# Patient Record
Sex: FEMALE
[De-identification: names, ages, dates, MRNs, and addresses within clinical notes are randomized per-mention and may not be internally consistent; named-entity substitution may affect disease eponyms.]

---

## 2021-02-03 PROBLEM — Z00.00 ENCOUNTER FOR PREVENTIVE HEALTH EXAMINATION: Status: ACTIVE | Noted: 2021-02-03

## 2021-02-04 ENCOUNTER — APPOINTMENT (OUTPATIENT)
Dept: ORTHOPEDIC SURGERY | Facility: CLINIC | Age: 66
End: 2021-02-04
Payer: MEDICARE

## 2021-02-04 VITALS — SYSTOLIC BLOOD PRESSURE: 150 MMHG | OXYGEN SATURATION: 97 % | DIASTOLIC BLOOD PRESSURE: 80 MMHG | HEART RATE: 83 BPM

## 2021-02-04 VITALS — WEIGHT: 248 LBS | HEIGHT: 64 IN | BODY MASS INDEX: 42.34 KG/M2

## 2021-02-04 DIAGNOSIS — Z72.3 LACK OF PHYSICAL EXERCISE: ICD-10-CM

## 2021-02-04 DIAGNOSIS — Z86.79 PERSONAL HISTORY OF OTHER DISEASES OF THE CIRCULATORY SYSTEM: ICD-10-CM

## 2021-02-04 DIAGNOSIS — Z86.39 PERSONAL HISTORY OF OTHER ENDOCRINE, NUTRITIONAL AND METABOLIC DISEASE: ICD-10-CM

## 2021-02-04 DIAGNOSIS — M43.16 SPONDYLOLISTHESIS, LUMBAR REGION: ICD-10-CM

## 2021-02-04 DIAGNOSIS — Z78.9 OTHER SPECIFIED HEALTH STATUS: ICD-10-CM

## 2021-02-04 DIAGNOSIS — Z87.891 PERSONAL HISTORY OF NICOTINE DEPENDENCE: ICD-10-CM

## 2021-02-04 DIAGNOSIS — E66.01 MORBID (SEVERE) OBESITY DUE TO EXCESS CALORIES: ICD-10-CM

## 2021-02-04 DIAGNOSIS — Z60.2 PROBLEMS RELATED TO LIVING ALONE: ICD-10-CM

## 2021-02-04 DIAGNOSIS — Z87.09 PERSONAL HISTORY OF OTHER DISEASES OF THE RESPIRATORY SYSTEM: ICD-10-CM

## 2021-02-04 DIAGNOSIS — M17.0 BILATERAL PRIMARY OSTEOARTHRITIS OF KNEE: ICD-10-CM

## 2021-02-04 PROCEDURE — 99205 OFFICE O/P NEW HI 60 MIN: CPT

## 2021-02-04 PROCEDURE — 99072 ADDL SUPL MATRL&STAF TM PHE: CPT

## 2021-02-04 RX ORDER — SIMVASTATIN 80 MG/1
TABLET, FILM COATED ORAL
Refills: 0 | Status: ACTIVE | COMMUNITY

## 2021-02-04 RX ORDER — ASPIRIN 81 MG
81 TABLET, DELAYED RELEASE (ENTERIC COATED) ORAL
Refills: 0 | Status: ACTIVE | COMMUNITY

## 2021-02-04 RX ORDER — CARVEDILOL 3.12 MG/1
TABLET, FILM COATED ORAL
Refills: 0 | Status: ACTIVE | COMMUNITY

## 2021-02-04 RX ORDER — INSULIN ASPART 100 [IU]/ML
100 INJECTION, SOLUTION INTRAVENOUS; SUBCUTANEOUS
Refills: 0 | Status: ACTIVE | COMMUNITY

## 2021-02-04 RX ORDER — MULTIVITAMIN
TABLET ORAL
Refills: 0 | Status: ACTIVE | COMMUNITY

## 2021-02-04 RX ORDER — IRON/IRON ASP GLY/FA/MV-MIN 38 125-25-1MG
TABLET ORAL
Refills: 0 | Status: ACTIVE | COMMUNITY

## 2021-02-04 RX ORDER — AMLODIPINE BESYLATE 5 MG/1
TABLET ORAL
Refills: 0 | Status: ACTIVE | COMMUNITY

## 2021-02-04 RX ORDER — INSULIN GLARGINE 100 [IU]/ML
INJECTION, SOLUTION SUBCUTANEOUS
Refills: 0 | Status: ACTIVE | COMMUNITY

## 2021-02-04 RX ORDER — CLONIDINE HYDROCHLORIDE 0.3 MG/1
TABLET ORAL
Refills: 0 | Status: ACTIVE | COMMUNITY

## 2021-02-04 SDOH — SOCIAL STABILITY - SOCIAL INSECURITY: PROBLEMS RELATED TO LIVING ALONE: Z60.2

## 2021-02-04 NOTE — DISCUSSION/SUMMARY
[de-identified] : 66y/o female with bilateral knee osteoarthritis, lumbar scoliosis and spondylolisthesis\par - We reviewed the diagnoses, natural history, and treatment options. Conservative methods for the knee OA has failed. She will require staged bilateral total knee arthroplasty for definitive management. We reviewed the modifiable risk factors in her case. She has bilateral LE edema of unknown origin, uncontrolled HTN, poorly controlled DM, and morbid obesity. While I think it is unreasonable to expect all of these to come to resolution, I would like to at least get better control of the DM and HTN prior to booking for surgery. She understands and is agreeable to working toward better optimization prior to surgical scheduling.\par - Refer to Nutrition for better glycemic control and weight loss\par - Encouraged as much HEP as she can tolerate; she is unwilling to attempt further formal PT\par - Follow up with me in 2-3mo to recheck the above, possibly book for L TKA

## 2021-02-04 NOTE — PHYSICAL EXAM
[de-identified] : General appearance: well nourished and hydrated, pleasant, alert and oriented x 3, cooperative.  Morbidly obese body habitus mostly truncal.\par HEENT: normocephalic, EOM intact, wearing mask, external auditory canal clear.  \par Cardiovascular: bilateral 2+ pitting lower leg edema with shiny hairless appearance of skin, no varicosities, dorsalis pedis pulses palpable and symmetric.  \par Lymphatics: no palpable lymphadenopathy, no lymphedema.  \par Neurologic: sensation is normal, no muscle weakness in upper or lower extremities, patella tendon reflexes present and symmetric.  \par Dermatologic: skin moist, warm, no rash.  \par Spine: cervical spine with normal lordosis and painless range of motion, thoracic spine with normal kyphosis and painless range of motion, lumbosacral spine with normal lordosis and restricted painful range of motion.  Tenderness to palpation along midline lumbar spine and paraspinal musculature.\par Gait: slow to stand and get started. Bilaterally antalgic with cane, short slow strides, hunched forward posture.\par \par Left knee:\par - Inspection: small effusion, negative ecchymosis and erythema.  \par - Wounds: none.  \par - Alignment: normal.  \par - Palpation: medial joint line tenderness on palpation.  \par - ROM active: 5-90, pain on extremes of motion\par - Ligamentous laxity: negative Lachman, negative ant. drawer test, negative post. drawer test, negative pivot shift test, stable to varus stress, stable to valgus stress.  \par - Popliteal angle: 60 degrees\par - Muscle Test: 5/5 quad strength.  \par \par Right knee:\par - Inspection: moderate effusion, negative ecchymosis and erythema.  \par - Wounds: none.  \par - Alignment: normal.  \par - Palpation: medial joint line tenderness on palpation.  \par - ROM active: 0-90, pain on extremes of motion\par - Ligamentous laxity: negative Lachman, negative ant. drawer test, negative post. drawer test, negative pivot shift test, stable to varus stress, stable to valgus stress.  \par - Popliteal angle: 60 degrees\par - Muscle Test: 5/5 quad strength.   [de-identified] : Lumbar spine and bilateral hip and knee XRs were reviewed from [facility], all dated 1/4/21.\par \par The left knee demonstrates varus malalignment with lateral tibial subluxation. There is severe tricompartmental osteoarthritis most pronounced medially, Kellgren-Rashid 4. The patella sits at appropriate height and tracks centrally.\par \par The right knee demonstrates varus malalignment with lateral tibial subluxation. There is severe tricompartmental osteoarthritis most pronounced medially, Kellgren-Rashid 4. The patella sits at appropriate height and tracks centrally.\par \par The lumbar spine demonstrates mild degenerative scoliosis and exaggerated lordosis with forward-leaning posture. There is grade 1 anterolisthesis noted at L2/3, L3/4, L4/5, L5/S1. Multilevel facet arthrosis is present. \par \par There is no pelvic obliquity.\par \par Visualization of the hips is partially obscured by adiposity. The bilateral hips demonstrate normal alignment. There is no obvious arthritis. There is no acetabular or proximal femoral deformity. There is no radiographic osteonecrosis.

## 2021-02-04 NOTE — HISTORY OF PRESENT ILLNESS
[Worsening] : worsening [___ yrs] : [unfilled] year(s) ago [8] : a current pain level of 8/10 [Sitting] : sitting [Standing] : standing [Constant] : ~He/She~ states the symptoms seem to be constant [Rest] : relieved by rest [Bending] : worsened by bending [Direct Pressure] : worsened by direct pressure [Walking] : worsened by walking [Knee Flexion] : worsened with knee flexion [Acetaminophen] : relieved by acetaminophen [de-identified] : Note that correct spelling is Tasneem Hernandez.\par \par 64y/o female presenting for evaluation of left greater than right knee pain and stiffness. Symptoms progressive for approximately 6 years. She has receiving treatment with serial injections (CSI and MUELLER) by Dr. Layton with diminishing effectiveness. Last CSI was in Jan 2020, last HA Feb 2020. She received what sounds like local anesthetic injections in June or July 2020, and none since. Per her report, Dr. Lyaton is no longer offering further injections due to concerns of interactions with her medical issues. She attempted PT for a month in June 2020 and was unable to tolerate it. She takes Tylenol for pain without much relief. Her function is severely limited and she reports being unable to even cross a room without stopping for breaks. She uses a cane all the time. She has associated midline low back pain, no hip or groin pain. She has asthma and finds herself short of breath with very little exertion, though she denies severe asthma attacks. Other PMH include HTN, HLD, and DM. She reports that the HTN and DM are not well controlled; a recent colonoscopy was canceled due to HTN on day of procedure, and she believes her last A1c was 8.3. She also reports recently worsening bilateral leg edema. [de-identified] : describes sharp pain

## 2021-02-04 NOTE — CONSULT LETTER
[Dear  ___] : Dear  [unfilled], [Consult Letter:] : I had the pleasure of evaluating your patient, [unfilled]. [Please see my note below.] : Please see my note below. [Consult Closing:] : Thank you very much for allowing me to participate in the care of this patient.  If you have any questions, please do not hesitate to contact me. [Sincerely,] : Sincerely, [FreeTextEntry1] : I think that she would benefit from staged bilateral TKA, but also feel that further medical optimization would be helpful prior to scheduling the procedures. Anything that can be done to better control her sugars and BP will be crucial for surgical success. I'd like to see her back in a few months; so long as BP is acceptable and Hgb A1c is < 8 (preferably < 7.5), I will offer her surgery. [FreeTextEntry3] : Emery Quezada MD\par Orthopaedic Surgery - Adult Reconstruction\par [facility] [place]\par [address] [place] [address]\par p. [phone]\par f. [phone]